# Patient Record
Sex: MALE | Race: WHITE
[De-identification: names, ages, dates, MRNs, and addresses within clinical notes are randomized per-mention and may not be internally consistent; named-entity substitution may affect disease eponyms.]

---

## 2019-07-30 LAB
BUN BLD-MCNC: 25 MG/DL (ref 7–18)
GLUCOSE SERPLBLD-MCNC: 95 MG/DL (ref 74–106)
HCT VFR BLD CALC: 41 % (ref 39.6–49)
LYMPHOCYTES # SPEC AUTO: 0.8 K/UL (ref 0.7–4.9)
PMV BLD: 7.4 FL (ref 7.6–11.3)
POTASSIUM SERPL-SCNC: 4.7 MMOL/L (ref 3.5–5.1)
RBC # BLD: 4.07 M/UL (ref 4.33–5.43)

## 2019-07-30 NOTE — RAD REPORT
EXAM DESCRIPTION:  RAD - Chest Pa And Lat (2 Views) - 7/30/2019 3:18 pm

 

CLINICAL HISTORY:  Preop chest, pending soft tissue mass removal

 

COMPARISON:  None.

 

TECHNIQUE:  PA and lateral views of the chest were obtained.

 

FINDINGS:  The lungs are clear of an acute infiltrate, mass or failure finding. Diaphragm is flattene
d. Mild fibrotic changes present.   Heart size is normal and central vasculature is within normal arroyo
its.  No pleural effusion or pneumothorax seen.  No acute bony finding noted.  No aortic abnormality.


 

IMPRESSION:  No acute cardiopulmonary process.

 

Patient does appear to have some underlying obstructive lung disease.

## 2019-07-31 ENCOUNTER — HOSPITAL ENCOUNTER (OUTPATIENT)
Dept: HOSPITAL 97 - OR | Age: 74
Discharge: HOME | End: 2019-07-31
Attending: SURGERY
Payer: COMMERCIAL

## 2019-07-31 DIAGNOSIS — L72.0: Primary | ICD-10-CM

## 2019-07-31 DIAGNOSIS — M10.9: ICD-10-CM

## 2019-07-31 DIAGNOSIS — I10: ICD-10-CM

## 2019-07-31 DIAGNOSIS — Z85.820: ICD-10-CM

## 2019-07-31 DIAGNOSIS — Z79.899: ICD-10-CM

## 2019-07-31 PROCEDURE — 71046 X-RAY EXAM CHEST 2 VIEWS: CPT

## 2019-07-31 PROCEDURE — 11426 EXC H-F-NK-SP B9+MARG >4 CM: CPT

## 2019-07-31 PROCEDURE — 85025 COMPLETE CBC W/AUTO DIFF WBC: CPT

## 2019-07-31 PROCEDURE — 88305 TISSUE EXAM BY PATHOLOGIST: CPT

## 2019-07-31 PROCEDURE — 80048 BASIC METABOLIC PNL TOTAL CA: CPT

## 2019-07-31 PROCEDURE — 36415 COLL VENOUS BLD VENIPUNCTURE: CPT

## 2019-07-31 PROCEDURE — 93005 ELECTROCARDIOGRAM TRACING: CPT

## 2019-07-31 PROCEDURE — 0JB40ZX EXCISION OF RIGHT NECK SUBCUTANEOUS TISSUE AND FASCIA, OPEN APPROACH, DIAGNOSTIC: ICD-10-PCS

## 2019-07-31 PROCEDURE — 12042 INTMD RPR N-HF/GENIT2.6-7.5: CPT

## 2019-07-31 PROCEDURE — 0JB50ZX EXCISION OF LEFT NECK SUBCUTANEOUS TISSUE AND FASCIA, OPEN APPROACH, DIAGNOSTIC: ICD-10-PCS

## 2019-07-31 PROCEDURE — 88304 TISSUE EXAM BY PATHOLOGIST: CPT

## 2019-07-31 NOTE — OP
Date of Procedure:  07/31/2019



Surgeon:  Fletcher Gonzales MD



Preoperative Diagnoses:  Tender posterior neck mass, history of history of skin cancer.



Postoperative Diagnoses:  Tender posterior neck mass, history of history of skin cancer.



Procedure:  Excisional biopsy of tender posterior neck subcutaneous mass, 4 x 4 cm.



Specimen:  Posterior neck mass.



Indications:  This is a case of a 73-year-old patient with multiple medical problems including being 
treated currently for cancer.  Patient has this new mass in the posterior neck, increasing in size an
d discomfort and tenderness.  He is afraid it could be something neoplastic.  At the same time, if it
 is not neoplastic and is an infected cyst, then it would also put him at risk since he is right now 
on current therapy for his other conditions.  He wants that excised.  It is large, is in a difficult 
area, so we going to do this under anesthesia with benefits, alternatives, and risks including but no
t limited to infection, bleeding, damage to adjacent structures, anesthesia complication, recurrence,
 MI, and even death.  He also understands this may not relieve his symptoms.  He might need more than
 one surgical intervention.  He understood and signed a consent.  The area of concern was marked by m
e and the patient in the holding room.



Description Of Procedure:  Patient was brought to the operating room, placed in supine position.  Ane
sthesia was done without complication.  Patient was placed in lateral decubitus position with proper 
protection.  Neck area was prepped and draped in a sterile fashion.  A wedge incision was made in the
 skin since the mass also was attached partially to the deep subcutaneous tissue and the skin area.  
So, we made a wedge incision and took this all the way down to the muscle.  The muscle seemed not to 
be attached to it.  The mass was completely excised.  The area was irrigated.  This had to be closed 
in layers since it was deep with deep layers with a 3-0 chromic and then a 2-0 nylon on top.  This wa
s after hemostasis, irrigation, and local anesthetic.  Patient tolerated the procedure well.  Patient
 was sent to recovery room in stable condition.  Sponge counts and instrument counts were correct.





TRI/COMFORT

DD:  07/31/2019 10:42:48Voice ID:  [id number]

DT:  07/31/2019 21:33:34Report ID:  [id number]

## 2019-07-31 NOTE — XMS REPORT
Clinical Summary

 Created on:2019



Patient:Franklyn Tran

Sex:Male

:1945

External Reference #:[id number]





Demographics







 Address  [address] [place] [address]

 

 Home Phone  [phone]

 

 Mobile Phone  [phone]

 

 Email Address  [email or web address]

 

 Preferred Language  English

 

 Marital Status  

 

 Samaritan Affiliation  Unknown

 

 Race  White

 

 Ethnic Group  Not  or 









Author







 Organization  [facility]

 

 Address  [address] [place] [address]









Support







 Name  Relationship  Address  Phone

 

 Dolly Tran  Spouse  [address]  [phone]



     [place] [address]  









Care Team Providers







 Name  Role  Phone

 

 Kathleen Bustillo DO  Primary Care Provider  [phone]









Allergies

No Known Allergies



Medications







 Medication  Sig  Dispensed  Refills  Start Date  End Date  Status

 

 allopurinol  TAKE 1 TABLET    0  11/10/2017    Active



 (ZYLOPRIM) 300 MG  BY MOUTH EVERY          



 tablet  DAY          

 

 lisinopril  Take 20 mg by    0      Active



 (PRINIVIL,ZESTRIL)  mouth.          



 20 mg tablet            

 

 omeprazole  Reported on    0  2016    Active



 (PriLOSEC) 20 MG  3/27/2017          



 capsule            

 

 amLODIPine  Take 1 tablet  90 tablet  3  2019  Active



 (NORVASC) 5 mg  (5 mg total) by          



 tablet  mouth daily.          

 

 amLODIPine  Take 1 tablet  90 tablet  3  2018  Discontinued



 (NORVASC) 5 mg  (5 mg total) by          



 tablet  mouth daily.          







Active Problems







 Problem  Noted Date

 

 Essential hypertension  2018







Encounters







 Date  Type  Specialty  Care Team  Description

 

 2019  Office Visit  Cardiology  Ranjit Polanco MD  Essential 
hypertension



         (Primary Dx)

 

 2019  Orders Only  Cardiology  Angel Luis Duran MA  

 

 2018  Office Visit  Cardiology  Ranjit Polanco MD  Essential 
hypertension



         (Primary Dx)



after 2018



Family History







 Medical History  Relation  Name  Comments

 

 Heart failure  Father    

 

 Heart failure  Mother    

 

 Heart failure  Paternal Uncle    









 Relation  Name  Status  Comments

 

 Father      

 

 Mother      

 

 Paternal Uncle      







Social History







 Tobacco Use  Types  Packs/Day  Years Used  Date

 

 Never Smoker        









 Smokeless Tobacco: Never Used      









 Alcohol Use  Drinks/Week  oz/Week  Comments

 

 No      









 Sex Assigned at Birth  Date Recorded

 

 Not on file  









 Job Start Date  Occupation  Industry

 

 Not on file  Not on file  Not on file









 Travel History  Travel Start  Travel End









 No recent travel history available.







Last Filed Vital Signs







 Vital Sign  Reading  Time Taken

 

 Blood Pressure  151/84  2019 11:47 AM CDT

 

 Pulse  69  2019 11:47 AM CDT

 

 Temperature  -  -

 

 Respiratory Rate  -  -

 

 Oxygen Saturation  -  -

 

 Inhaled Oxygen Concentration  -  -

 

 Weight  90.3 kg (199 lb)  2019 11:47 AM CDT

 

 Height  180.3 cm (5' 11")  2019 11:47 AM CDT

 

 Body Mass Index  27.75  2019 11:47 AM CDT







Plan of Treatment







 Date  Type  Specialty  Care Team  Description

 

 2020  Office Visit  Cardiology  Ranjit Polanco MD



  



       [address] [address] [phone] (Fax)  









 Health Maintenance  Due Date  Last Done  Comments

 

 COLONOSCOPY SCREENING  1995    

 

 SHINGLES VACCINES (#1)  1995    

 

 65+ PNEUMOCOCCAL VACCINE (1 of 2 - PCV13)  2010    

 

 INFLUENZA VACCINE  2019    







Procedures







 Procedure Name  Priority  Date/Time  Associated Diagnosis  Comments

 

 ECG 12-LEAD  Routine  2019 10:49 AM  Essential hypertension  Results for 
this



     CDT    procedure are in



         the results



         section.



after 2018



Results

ECG 12 lead (2019 10:49 AM CDT)





 Component  Value  Ref Range  Performed At  Pathologist Signature

 

 Ventricular rate  62    [facility]  

 

 Atrial rate  62    [facility]  

 

 MO interval  148    [facility]  

 

 QRSD interval  88    [facility]  

 

 QT interval  384    [facility]  

 

 QTC interval  389    [facility]  

 

 P axis 1  54    [facility]  

 

 QRS axis 1  82    [facility]  

 

 T wave axis  28    [facility]  

 

 EKG impression  Normal sinus    [facility]  



   rhythm-Normal ECG-In      



   automated comparison      



   with ECG of 20-MAR-2018      



   10:57,-No significant      



   change was      



   found-Electronically      



   Signed By Aubrey Nixon MD      



   ([id number]) on 3/19/2019      



   8:31:44 PM      









 Specimen

 

 









 Narrative  Performed At

 

 This result has an attachment that is not available.



  









 Performing Organization  Address  City/State/Zipcode  Phone Number

 

 [facility]  [address] [place] [address]  



after 2018



Insurance







 Payer  Benefit Plan / Group  Subscriber ID  Effective Dates  Phone  Address  
Type

 

 AETNA MEDICARE  [employer]NA MEDICARE  xxxxxxxx  2014-Present      HMO



   HMO/PPO [facility]          









 Guarantor Name  Account Type  Relation to  Date of Birth  Phone  Billing



     Patient      Address

 

 Franklyn Tran  Personal/Family  Self  1945  [phone]  [address]







         (Home)  [place] [address]







Advance Directives

Patient has advance care planning documents on file. For more information, 
please contact:Rafal Rich[address] [place] [address]

## 2019-07-31 NOTE — EKG
Test Date:    2019-07-30               Test Time:    15:06:08

Technician:   LUC                                    

                                                     

MEASUREMENT RESULTS:                                       

Intervals:                                           

Rate:         64                                     

RI:           152                                    

QRSD:         94                                     

QT:           384                                    

QTc:          396                                    

Axis:                                                

P:            50                                     

RI:           152                                    

QRS:          40                                     

T:            26                                     

                                                     

INTERPRETIVE STATEMENTS:                                       

                                                     

Normal sinus rhythm

Normal ECG

Compared to ECG 10/03/2016 10:30:43

No significant changes



Electronically Signed On 07-31-19 10:49:45 CDT by Velasquez Varma

## 2019-07-31 NOTE — XMS REPORT
Patient Summary Document

 Created on:2019



Patient:GENOVEVA GOLD

Sex:Male

:1945

External Reference #:[id number]





Demographics







 Address  [address] [place] [address]

 

 Home Phone  [phone]

 

 Email Address  [email or web address]

 

 Preferred Language  Unknown

 

 Marital Status  Unknown

 

 Druze Affiliation  Unknown

 

 Race  Unknown

 

 Additional Race(s)  Unavailable

 

 Ethnic Group  Unknown









Author







 Organization  [facility]

 

 Address  [address] [place] [address] Dr. Hartman [address] [place] [address]

 

 Phone  [phone]









Care Team Providers







 Name  Role  Phone

 

 Unavailable  Unavailable  Unavailable









Problems

This patient has no known problems.



Allergies, Adverse Reactions, Alerts

This patient has no known allergies or adverse reactions.



Medications

This patient has no known medications.



Encounters







 Start  End  Encounter  Admission  Attending  Care  Care  Encounter



 Date/Time  Date/Time  Type  Type  Clinicians  Facility  Department  ID

 

 2019  Outpatient      [id number]



 06:05:00  06:05:00

## 2019-07-31 NOTE — P.BOP
Preoperative diagnosis: tender posterior neck mass 4x4 cm


Postoperative diagnosis: same


Primary procedure: Excisional biopsy of tender posterior neck subcutneous mass 

4x4 cm


Estimated blood loss: <10cc


Specimen:  posterior neck mass 4x4 cm


Anesthesia: General


Complications: None


Transferred to: Recovery Room


Condition: Good

## 2019-07-31 NOTE — DS
Date of Discharge:  07/31/2019



Diagnosis:  Tender posterior neck mass.



Procedure:  Excisional biopsy of tender posterior neck mass, 4 x 4 cm.



Disposition:  Home.



Activity:  As tolerated.  No heavy lifting.



Followup:  Followup in my office in 1 week.  Call for appointment, [phone].



Discharge Instructions:  Keep the area dry for 48 hours, then may shower.



Medications:  See orders.





TRI/COMFORT

DD:  07/31/2019 10:42:48Voice ID:  [id number]

DT:  07/31/2019 21:36:35Report ID:  [id number]

## 2020-03-03 LAB
BUN BLD-MCNC: 22 MG/DL (ref 7–18)
GLUCOSE SERPLBLD-MCNC: 110 MG/DL (ref 74–106)
HCT VFR BLD CALC: 40.6 % (ref 39.6–49)
LYMPHOCYTES # SPEC AUTO: 0.8 K/UL (ref 0.7–4.9)
PMV BLD: 6.9 FL (ref 7.6–11.3)
POTASSIUM SERPL-SCNC: 4.6 MMOL/L (ref 3.5–5.1)
RBC # BLD: 4.04 M/UL (ref 4.33–5.43)

## 2020-03-03 NOTE — RAD REPORT
EXAM DESCRIPTION:  Charis Shaw (2 Views)3/3/2020 10:50 am

 

CLINICAL HISTORY:  Hypertension/preop for hernia repair

 

COMPARISON:  2019

 

FINDINGS:   The lungs appear clear of acute infiltrate. The heart is normal size. Mild left pleural t
hickening suspected

 

IMPRESSION:   No acute abnormalities displayed

## 2020-03-03 NOTE — EKG
Test Date:    2020-03-03               Test Time:    10:28:26

Technician:   BRIGID                                   

                                                     

MEASUREMENT RESULTS:                                       

Intervals:                                           

Rate:         60                                     

CA:           154                                    

QRSD:         92                                     

QT:           402                                    

QTc:          402                                    

Axis:                                                

P:            38                                     

CA:           154                                    

QRS:          18                                     

T:            16                                     

                                                     

INTERPRETIVE STATEMENTS:                                       

                                                     

Normal sinus rhythm

Normal ECG

Compared to ECG 07/30/2019 15:06:08

No significant changes



Electronically Signed On 03-03-20 12:21:11 CST by Ezequiel Mcdonald

## 2020-03-04 ENCOUNTER — HOSPITAL ENCOUNTER (OUTPATIENT)
Dept: HOSPITAL 97 - OR | Age: 75
Discharge: HOME | End: 2020-03-04
Attending: SURGERY
Payer: COMMERCIAL

## 2020-03-04 VITALS — OXYGEN SATURATION: 96 % | DIASTOLIC BLOOD PRESSURE: 78 MMHG | SYSTOLIC BLOOD PRESSURE: 130 MMHG | TEMPERATURE: 98.1 F

## 2020-03-04 DIAGNOSIS — I10: ICD-10-CM

## 2020-03-04 DIAGNOSIS — E78.00: ICD-10-CM

## 2020-03-04 DIAGNOSIS — K40.90: Primary | ICD-10-CM

## 2020-03-04 DIAGNOSIS — Z85.118: ICD-10-CM

## 2020-03-04 DIAGNOSIS — Z80.0: ICD-10-CM

## 2020-03-04 DIAGNOSIS — K21.9: ICD-10-CM

## 2020-03-04 DIAGNOSIS — Z82.49: ICD-10-CM

## 2020-03-04 PROCEDURE — 80048 BASIC METABOLIC PNL TOTAL CA: CPT

## 2020-03-04 PROCEDURE — 85025 COMPLETE CBC W/AUTO DIFF WBC: CPT

## 2020-03-04 PROCEDURE — 93005 ELECTROCARDIOGRAM TRACING: CPT

## 2020-03-04 PROCEDURE — 36415 COLL VENOUS BLD VENIPUNCTURE: CPT

## 2020-03-04 PROCEDURE — 0YU54JZ SUPPLEMENT RIGHT INGUINAL REGION WITH SYNTHETIC SUBSTITUTE, PERCUTANEOUS ENDOSCOPIC APPROACH: ICD-10-PCS

## 2020-03-04 PROCEDURE — 49650 LAP ING HERNIA REPAIR INIT: CPT

## 2020-03-04 PROCEDURE — 71046 X-RAY EXAM CHEST 2 VIEWS: CPT

## 2020-03-04 RX ADMIN — MEPERIDINE HYDROCHLORIDE ONE MG: 25 INJECTION, SOLUTION INTRAMUSCULAR; INTRAVENOUS; SUBCUTANEOUS at 10:05

## 2020-03-04 RX ADMIN — HYDROMORPHONE HYDROCHLORIDE ONE MG: 1 INJECTION, SOLUTION INTRAMUSCULAR; INTRAVENOUS; SUBCUTANEOUS at 10:18

## 2020-03-04 RX ADMIN — HYDROMORPHONE HYDROCHLORIDE ONE MG: 1 INJECTION, SOLUTION INTRAMUSCULAR; INTRAVENOUS; SUBCUTANEOUS at 10:25

## 2020-03-04 RX ADMIN — MEPERIDINE HYDROCHLORIDE ONE MG: 25 INJECTION, SOLUTION INTRAMUSCULAR; INTRAVENOUS; SUBCUTANEOUS at 10:10

## 2020-03-04 NOTE — XMS REPORT
Patient Summary Document

 Created on:2020



Patient:GENOVEVA GOLD

Sex:Male

:1945

External Reference #:[id number]





Demographics







 Address  [address] [place] [address]

 

 Home Phone  [phone]

 

 Email Address  [email or web address]

 

 Preferred Language  Unknown

 

 Marital Status  Unknown

 

 Zoroastrian Affiliation  Unknown

 

 Race  Unknown

 

 Additional Race(s)  Unavailable

 

 Ethnic Group  Unknown









Author







 Organization  [facility]

 

 Address  [address] [place] [address] Dr. Hartman [address] [place] [address]

 

 Phone  [phone]









Care Team Providers







 Name  Role  Phone

 

 Unavailable  Unavailable  Unavailable









Problems

This patient has no known problems.



Allergies, Adverse Reactions, Alerts

This patient has no known allergies or adverse reactions.



Medications

This patient has no known medications.



Encounters







 Start  End  Encounter  Admission  Attending  Care  Care  Encounter



 Date/Time  Date/Time  Type  Type  Clinicians  Facility  Department  ID

 

 2019  Outpatient      [facility]  [id number]



 06:05:00  06:05:00

## 2020-03-04 NOTE — P.BOP
Preoperative diagnosis: tender right inguinal hernia


Postoperative diagnosis: same


Primary procedure: Laparoscopic repair of Right inguinal hernia with mesh


Assistant: CARISA WILD (RNFA)


Estimated blood loss: <10cc


Specimen: none


Findings: direct and indirect hernias R


Anesthesia: General


Complications: None


Drain(s): Other


Transferred to: Recovery Room


Condition: Good

## 2020-03-06 NOTE — OP
Date of Procedure:  03/06/2020



Surgeon:  Fletcher Gonzales MD



Assistant:  ELLEN Ventura.



Preoperative Diagnosis:  Tender right inguinal hernia.



Postoperative Diagnosis:  Tender right inguinal hernia.



Procedure:  Laparoscopic repair of right inguinal hernia with mesh.



Findings:  Direct and indirect hernia.



Anesthesia:  General plus local.



Indications:  This is a case of a 74-year-old patient, who comes to us with a tender right inguinal h
ernia.  The benefits, alternatives, and risks of repair fully explained, which include, but are not l
imited to infection, bleeding, damage to adjacent structures, anesthesia complication, chronic pain, 
chronic numbness, recurrence MI, even death.  He also understands this may not relieve any symptoms. 
 He might need more than one surgical intervention.  He signed a consent.  He was also explained the 
intention of most likely putting a mesh in that region.  Pros and cons of mesh placement were discuss
ed with the patient.  All the questions were answered to his satisfaction.  He did consent for mesh p
lacement.



Description Of Procedure:  Patient brought to the operating room, placed in supine position, anesthes
ia was without any complication.  A time-out was called.  Abdominal and inguinal area were prepped an
d draped in sterile fashion.  Local anesthesia was applied followed by sharp incision of the skin in 
the infraumbilical region.  Anterior rectus sheath was identified on the right side.  An incision was
 made in that area.  Muscle retracted laterally to expose the posterior rectus sheath.  The extraperi
toneal space was gently developed with the help of blunt dissection and a space maker balloon tipped 
trocar placed over the area directed towards the pubic symphysis.  A laparoscope was then introduced.
  The balloon was inflated under direct visualization to create the extraperitoneal space.  After judith
t, I will remove the balloon.  Insufflate the area.  A 5 mm trocar was placed in the area near of the
 pubic symphysis and another one penitentiary between the first and the second one.  The preperitoneal spa
ce was gently developed.  We exposed the inferior epigastric vessels and kept them anterior.  Allan 
ligament was dissected laterally to the junction of the iliac veins.  Avoiding damage to the femoral 
branch of the genitofemoral nerve and lateral femoral cutaneous nerve.  The cord structures were visu
alized and skeletonized.  We noticed two, direct and indirect, both of them were carefully pull out g
ently.  We will retract into the peritoneal space.  At that moment, I have a 3D mesh right side that 
was introduced in the working space to one of the trocar sites.  They were aligned to cover direct an
d indirect spaces.  The mesh was placed and tackle laterally and superior to the iliopubic tract,in t
he inferior medial to the Allan ligament. 



We made sure we had proper hemostasis.  Under direct visualization and retracting the hernia sac it i
s still into the peritoneal cavity.  We proceeded to deflate the area while holding the mesh in place
.  At that moment, I proceeded to remove the trocars under direct visualization.  Closed the anterior
 rectus sheath with #1 Vicryl, and the skin approximated.  Sponge count and instrument counts were co
rrect.  At the end of the case, the testicles were within the scrotum.



Disposition:  Home.



Activity:  As tolerated.  No heavy lifting.



Followup:  In my office in 1 week.  Call for an appointment at [phone]



Discharge Instructions:  Keep the area dry for 48 hours.  Cold compresses to the right inguinal regio
n for 24 hours.



Medications:  See orders.





TRI/MODL

DD:  03/06/2020 10:49:43Voice ID:  [id number]

DT:  03/06/2020 19:52:42Report ID:  [id number]